# Patient Record
Sex: MALE | Race: WHITE | NOT HISPANIC OR LATINO | Employment: FULL TIME | URBAN - METROPOLITAN AREA
[De-identification: names, ages, dates, MRNs, and addresses within clinical notes are randomized per-mention and may not be internally consistent; named-entity substitution may affect disease eponyms.]

---

## 2022-09-27 PROCEDURE — 96374 THER/PROPH/DIAG INJ IV PUSH: CPT

## 2022-09-27 PROCEDURE — 96375 TX/PRO/DX INJ NEW DRUG ADDON: CPT

## 2022-09-27 PROCEDURE — 99284 EMERGENCY DEPT VISIT MOD MDM: CPT | Mod: 25

## 2022-09-27 PROCEDURE — 250N000011 HC RX IP 250 OP 636: Performed by: EMERGENCY MEDICINE

## 2022-09-27 RX ORDER — PREDNISONE 20 MG/1
TABLET ORAL
Qty: 10 TABLET | Refills: 0 | Status: SHIPPED | OUTPATIENT
Start: 2022-09-27 | End: 2023-02-16

## 2022-09-27 RX ORDER — EPINEPHRINE 0.3 MG/.3ML
0.3 INJECTION SUBCUTANEOUS
Qty: 2 EACH | Refills: 0 | Status: SHIPPED | OUTPATIENT
Start: 2022-09-27

## 2022-09-27 RX ORDER — METHYLPREDNISOLONE SODIUM SUCCINATE 125 MG/2ML
125 INJECTION, POWDER, LYOPHILIZED, FOR SOLUTION INTRAMUSCULAR; INTRAVENOUS ONCE
Status: COMPLETED | OUTPATIENT
Start: 2022-09-27 | End: 2022-09-27

## 2022-09-27 RX ADMIN — METHYLPREDNISOLONE SODIUM SUCCINATE 125 MG: 125 INJECTION, POWDER, FOR SOLUTION INTRAMUSCULAR; INTRAVENOUS at 23:56

## 2022-09-27 RX ADMIN — FAMOTIDINE 20 MG: 10 INJECTION, SOLUTION INTRAVENOUS at 23:59

## 2022-09-27 ASSESSMENT — ENCOUNTER SYMPTOMS
CHILLS: 0
SHORTNESS OF BREATH: 0
FEVER: 0
DIAPHORESIS: 0
COUGH: 1
ABDOMINAL PAIN: 0
DIARRHEA: 0
LIGHT-HEADEDNESS: 0

## 2022-09-28 ENCOUNTER — HOSPITAL ENCOUNTER (EMERGENCY)
Facility: HOSPITAL | Age: 32
Discharge: HOME | End: 2022-09-28
Attending: EMERGENCY MEDICINE | Admitting: EMERGENCY MEDICINE
Payer: COMMERCIAL

## 2022-09-28 VITALS
HEIGHT: 71 IN | HEART RATE: 62 BPM | DIASTOLIC BLOOD PRESSURE: 63 MMHG | BODY MASS INDEX: 31.5 KG/M2 | RESPIRATION RATE: 17 BRPM | TEMPERATURE: 98.3 F | OXYGEN SATURATION: 96 % | SYSTOLIC BLOOD PRESSURE: 114 MMHG | WEIGHT: 225 LBS

## 2022-09-28 DIAGNOSIS — L50.9 URTICARIA: ICD-10-CM

## 2022-09-28 NOTE — DISCHARGE INSTRUCTIONS
You can take Benadryl 50 mg (two tablets) as needed every 4 - 6 hours for itching. If Benadryl makes you too sleepy, you can use Claritin 10 mg twice a day for itching. On top of that, you can take Pepcid 20 mg as needed twice a day for itching as well.

## 2022-09-28 NOTE — ED TRIAGE NOTES
Patient reports that he has a rash that started about ten minutes ago watching TV, unsure what would have caused it has had reaction to be sting that began like this and progressed about 20 years ago. States at this time he only has a rash.

## 2022-09-28 NOTE — ED PROVIDER NOTES
EMERGENCY DEPARTMENT ENCOUNTER      NAME: Travis Milan  AGE: 31 year old male  YOB: 1990  MRN: [id number]  EVALUATION DATE & TIME: No admission date for patient encounter.    PCP: No primary care provider on file.    ED PROVIDER: Tereza Valencia MD      Chief Complaint   Patient presents with     Rash         FINAL IMPRESSION:  1. Urticaria          ED COURSE & MEDICAL DECISION MAKING:    Pertinent Labs & Imaging studies reviewed. (See chart for details)  31 year old male presents to the Emergency Department for evaluation of rash.  He noticed sudden onset of this rash about 15 to 20 minutes prior to arrival.  He initially noticed that he was itching.  On evaluation, he has urticaria on his bilateral torso diffusely and extending into his back.  He denies any systemic involvement, including no throat tightness, throat itching, shortness of breath, abdominal pain, diarrhea, lightheadedness.  Patient had taken 75 mg of diphenhydramine prior to arrival.  Patient was given IV Solu-Medrol, IV famotidine, given he has no systemic involvement, no epinephrine was given.  We will plan to observe the patient and so long as there is no progression of his allergic reaction, will discharge home with oral prednisone and a prescription for EpiPen.  I also did put in a referral to follow-up with allergy given there is no clear etiology or trigger to his urticaria.    11:20 PM I met with patient for initial encounter.  12:44 AM I checked on patient. Rash is progressively improving. Discussed with the patient continued management of urticaria with supportive care, use of epipen if involvement of airway in the future, and follow-up with allergy.    At the conclusion of the encounter I discussed the results of all of the tests and the disposition. The questions were answered. The patient or family acknowledged understanding and was agreeable with the care plan.       MEDICATIONS GIVEN IN THE  EMERGENCY:  Medications   methylPREDNISolone sodium succinate (solu-MEDROL) injection 125 mg (125 mg Intravenous Given 9/27/22 2356)   famotidine (PEPCID) injection 20 mg (20 mg Intravenous Given 9/27/22 2359)       NEW PRESCRIPTIONS STARTED AT TODAY'S ER VISIT  New Prescriptions    EPINEPHRINE (ANY BX GENERIC EQUIV) 0.3 MG/0.3ML INJECTION 2-PACK    Inject 0.3 mLs (0.3 mg) into the muscle once as needed for anaphylaxis May repeat one time in 5-15 minutes if response to initial dose is inadequate.    PREDNISONE (DELTASONE) 20 MG TABLET    Take two tablets (= 40mg) each day for 5 (five) days          =================================================================    HPI    Patient information was obtained from: Patient    Use of : N/A         Travis Milan is a 31 year old male with a pertinent history of tobacco use who presents to this ED via walk-in for evaluation of rash.    Patient states he has a diffuse itchy rash on his torso which began 20 minutes ago when he was watching television and extends to his neck. He also notes some cough and congestion which resolved over the weekend. Patient reports having a similar rash after a bee sting which progressed to anaphylaxis 20 years ago. He took 3 25mg tablets of benadryl before arrival to ED. Patient denies any new foods, medications, insect bites or exposures. Patient vapes and is a former smoker.     Patient denies throat tightening, shortness of breath, abdominal pain, diarrhea, light headedness, fever, chills, sweats and all other relevant symptoms.      REVIEW OF SYSTEMS   Review of Systems   Constitutional: Negative for chills, diaphoresis and fever.   HENT: Positive for congestion (Resolved).    Respiratory: Positive for cough (Resolved). Negative for shortness of breath.    Gastrointestinal: Negative for abdominal pain and diarrhea.   Skin: Positive for rash.   Neurological: Negative for light-headedness.   All other systems reviewed and  "are negative.       PAST MEDICAL HISTORY:  History reviewed. No pertinent past medical history.    PAST SURGICAL HISTORY:  History reviewed. No pertinent surgical history.        CURRENT MEDICATIONS:    EPINEPHrine (ANY BX GENERIC EQUIV) 0.3 MG/0.3ML injection 2-pack  predniSONE (DELTASONE) 20 MG tablet        ALLERGIES:  Allergies   Allergen Reactions     Penicillins Rash       FAMILY HISTORY:  No family history on file.    SOCIAL HISTORY:   Social History     Socioeconomic History     Marital status: Single       VITALS:  BP (!) 158/97   Pulse 67   Temp 98.3  F (36.8  C) (Temporal)   Resp 20   Ht 1.803 m (5' 11\")   Wt 102.1 kg (225 lb)   SpO2 99%   BMI 31.38 kg/m      PHYSICAL EXAM    Constitutional: Well developed, Well nourished, NAD  HENT: Normocephalic, Atraumatic, Bilateral external ears normal, Oropharynx normal, mucous membranes moist, Nose normal.   Neck- Normal range of motion, No tenderness, Supple, No stridor.  Eyes: PERRL, EOMI, Conjunctiva normal, No discharge.   Respiratory: Normal breath sounds, No respiratory distress  Cardiovascular: Normal heart rate, Regular rhythm  GI: Bowel sounds normal, Soft, No tenderness,   Musculoskeletal: No edema. Good range of motion in all major joints. No tenderness to palpation or major deformities noted.   Integument: Warm, Dry. Erythematous maculopapular blanching rash to diffuse bilateral torso, extending to bilateral back.  Neurologic: Alert & oriented x 3, Normal motor function, Normal sensory function, No focal deficits noted. Normal gait.   Psychiatric: Affect normal, Judgment normal, Mood normal.       I, Rylan Medina, am serving as a scribe to document services personally performed by Tereza Valencia, based on my observation and the provider's statements to me. I, Tereza Valencia MD, attest that Rylan Medina is acting in a scribe capacity, has observed my performance of the services and has documented them in accordance with my direction.    Tereza" MD Erik  Emergency Medicine  [facility] EMERGENCY DEPARTMENT  [facility] [facility] [id number] [phone]      Tereza Valencia MD  09/28/22 0050

## 2023-02-16 ENCOUNTER — LAB (OUTPATIENT)
Dept: LAB | Facility: CLINIC | Age: 33
End: 2023-02-16
Payer: COMMERCIAL

## 2023-02-16 ENCOUNTER — OFFICE VISIT (OUTPATIENT)
Dept: ALLERGY | Facility: CLINIC | Age: 33
End: 2023-02-16
Attending: EMERGENCY MEDICINE
Payer: COMMERCIAL

## 2023-02-16 VITALS
WEIGHT: 238.7 LBS | RESPIRATION RATE: 16 BRPM | HEART RATE: 57 BPM | OXYGEN SATURATION: 99 % | BODY MASS INDEX: 33.29 KG/M2

## 2023-02-16 DIAGNOSIS — L50.9 URTICARIA: ICD-10-CM

## 2023-02-16 DIAGNOSIS — Z91.030 ALLERGY TO BEE STING: ICD-10-CM

## 2023-02-16 DIAGNOSIS — L50.9 URTICARIA: Primary | ICD-10-CM

## 2023-02-16 PROCEDURE — 99203 OFFICE O/P NEW LOW 30 MIN: CPT | Performed by: ALLERGY & IMMUNOLOGY

## 2023-02-16 PROCEDURE — 82785 ASSAY OF IGE: CPT

## 2023-02-16 PROCEDURE — 36415 COLL VENOUS BLD VENIPUNCTURE: CPT

## 2023-02-16 PROCEDURE — 86003 ALLG SPEC IGE CRUDE XTRC EA: CPT

## 2023-02-16 NOTE — PROGRESS NOTES
Kacey Robles is a 32 year old, presenting for the following health issues:  Allergy Consult (ER visit in September with hives on back and chest. Given prednisone. Has not happened since)      HPI     Chief complaint: Hives    History of present illness: This is a pleasant 32-year-old gentleman I was asked to see for evaluation by Dr. Valencia in regards to hives.  Patient states that in September of last year he was at home watching TV at night.  He started to notice that his back was itching and then he noticed hives that appeared all over his back and chest.  He immediately went to the emergency room where he was treated with antihistamines.  He states he took some Benadryl before going as well.  He was then placed on prednisone and the hives did not return.  No other systemic symptoms of swelling, shortness of breath, gastrointestinal symptoms.  He states he has a known bee sting allergy when he was 9 or 11 years old he was stung by a bee and developed anaphylactic shock.  He states he had breathing difficulty, hives.  He was given epinephrine at that time.  Most recent sting was when he was in his early 20s which consisted of hives that were treated with Benadryl.  He states he thinks he is allergic to hornets.  He has been stung by a yellow jacket he states without any symptoms.  He does carry an epinephrine device.  He denies any sting on the day of the hive episode in September.  He has never had hives like this previously.  He does note he has some seasonal allergies especially during the spring.  He is not sure that he has allergies in the fall.  In the ER noted states that he had a cough and congestion a few days prior to this.  He does not recall this.  He does not think he took any over-the-counter medications and does not recall anything else that was unusual.  He states his mom has a history of intermittent urticaria.    Past medical history: Heart murmur    Social history: He lives in  a home with central air and basement, no changes at home, has dogs, former smoker    Family history: Mom with a history of urticaria    Review of Systems   Constitutional, HEENT, cardiovascular, pulmonary, gi and gu systems are negative, except as otherwise noted.      Objective    Pulse 57   Resp 16   Wt 108.3 kg (238 lb 11.2 oz)   SpO2 99%   BMI 33.29 kg/m    Body mass index is 33.29 kg/m .  Physical Exam     Gen: Pleasant male not in acute distress  HEENT: Eyes no erythema of the bulbar or palpebral conjunctiva, no edema. Ears: No deformities or lesions. Nose: No congestion,  Mouth: Throat clear, no lip or tongue edema.   Neck: No masses lesions or swelling  Respiratory: No coughing with breathing, no retractions  Lymph: No visible supraclavicular or cervical lymphadenopathy  Skin: No rashes or lesions  Psych: Alert and appropriate for age    Impression report and plan:  1.  Acute urticaria  2.  Bee sting allergy    Recommend checking specific IgE to the environmental panel as well as the 5 venomous stinging insects.  Recommended to have some cetirizine on hand at home in case he were to develop hives.  I did go over chronic hives given that his mom has a history of this and exacerbating factors.  He will let me know if the hives return record all events of previous few hours.  The fact that he had cough and congestion could be that this was virally mediated.  I will follow-up the patient once testing returns.

## 2023-02-16 NOTE — LETTER
2/16/2023         RE: Travis Milan  [address]  [place] [facility] [address]        Dear Colleague,    Thank you for referring your patient, Travis Milan, to the [facility]. Please see a copy of my visit note below.          Subjective   Travis is a 32 year old, presenting for the following health issues:  Allergy Consult (ER visit in September with hives on back and chest. Given prednisone. Has not happened since)      HPI     Chief complaint: Hives    History of present illness: This is a pleasant 32-year-old gentleman I was asked to see for evaluation by Dr. Valencia in regards to hives.  Patient states that in September of last year he was at home watching TV at night.  He started to notice that his back was itching and then he noticed hives that appeared all over his back and chest.  He immediately went to the emergency room where he was treated with antihistamines.  He states he took some Benadryl before going as well.  He was then placed on prednisone and the hives did not return.  No other systemic symptoms of swelling, shortness of breath, gastrointestinal symptoms.  He states he has a known bee sting allergy when he was 9 or 11 years old he was stung by a bee and developed anaphylactic shock.  He states he had breathing difficulty, hives.  He was given epinephrine at that time.  Most recent sting was when he was in his early 20s which consisted of hives that were treated with Benadryl.  He states he thinks he is allergic to hornets.  He has been stung by a yellow jacket he states without any symptoms.  He does carry an epinephrine device.  He denies any sting on the day of the hive episode in September.  He has never had hives like this previously.  He does note he has some seasonal allergies especially during the spring.  He is not sure that he has allergies in the fall.  In the ER noted states that he had a cough and congestion a few days prior to this.  He does  not recall this.  He does not think he took any over-the-counter medications and does not recall anything else that was unusual.  He states his mom has a history of intermittent urticaria.    Past medical history: Heart murmur    Social history: He lives in a home with central air and basement, no changes at home, has dogs, former smoker    Family history: Mom with a history of urticaria    Review of Systems   Constitutional, HEENT, cardiovascular, pulmonary, gi and gu systems are negative, except as otherwise noted.     Objective    Pulse 57   Resp 16   Wt 108.3 kg (238 lb 11.2 oz)   SpO2 99%   BMI 33.29 kg/m    Body mass index is 33.29 kg/m .  Physical Exam     Gen: Pleasant male not in acute distress  HEENT: Eyes no erythema of the bulbar or palpebral conjunctiva, no edema. Ears: No deformities or lesions. Nose: No congestion,  Mouth: Throat clear, no lip or tongue edema.   Neck: No masses lesions or swelling  Respiratory: No coughing with breathing, no retractions  Lymph: No visible supraclavicular or cervical lymphadenopathy  Skin: No rashes or lesions  Psych: Alert and appropriate for age    Impression report and plan:  1.  Acute urticaria  2.  Bee sting allergy    Recommend checking specific IgE to the environmental panel as well as the 5 venomous stinging insects.  Recommended to have some cetirizine on hand at home in case he were to develop hives.  I did go over chronic hives given that his mom has a history of this and exacerbating factors.  He will let me know if the hives return record all events of previous few hours.  The fact that he had cough and congestion could be that this was virally mediated.  I will follow-up the patient once testing returns.                       Again, thank you for allowing me to participate in the care of your patient.        Sincerely,        Joselin AYALA MD

## 2023-02-21 LAB
A ALTERNATA IGE QN: <0.1 KU(A)/L
A FUMIGATUS IGE QN: 0.17 KU(A)/L
BERMUDA GRASS IGE QN: 3.09 KU(A)/L
C HERBARUM IGE QN: <0.1 KU(A)/L
CAT DANDER IGG QN: 0.23 KU(A)/L
CEDAR IGE QN: 2.73 KU(A)/L
COMMON RAGWEED IGE QN: 3 KU(A)/L
COTTONWOOD IGE QN: 3.03 KU(A)/L
D FARINAE IGE QN: 0.88 KU(A)/L
D PTERONYSS IGE QN: 0.21 KU(A)/L
DOG DANDER+EPITH IGE QN: 0.67 KU(A)/L
HONEY BEE IGE QN: 7.85 KU(A)/L
IGE SERPL-ACNC: 160 KU/L (ref 0–114)
MAPLE IGE QN: 2.9 KU(A)/L
MARSH ELDER IGE QN: 2.85 KU(A)/L
MOUSE URINE PROT IGE QN: 0.12 KU(A)/L
NETTLE IGE QN: 2.57 KU(A)/L
P NOTATUM IGE QN: 0.1 KU(A)/L
PAPER WASP IGE QN: 4.26 KU(A)/L
ROACH IGE QN: 2.28 KU(A)/L
SALTWORT IGE QN: 2.63 KU(A)/L
SILVER BIRCH IGE QN: 2.93 KU(A)/L
TIMOTHY IGE QN: 3.04 KU(A)/L
WHITE ASH IGE QN: 3.31 KU(A)/L
WHITE ELM IGE QN: 3.02 KU(A)/L
WHITE MULBERRY IGE QN: 2.62 KU(A)/L
WHITE OAK IGE QN: 2.81 KU(A)/L
WHITEFACED HORNET IGE QN: 3.63 KU(A)/L
YELLOW HORNET IGE QN: 4.11 KU(A)/L
YELLOW JACKET IGE QN: 4.86 KU(A)/L

## 2023-05-21 ENCOUNTER — HEALTH MAINTENANCE LETTER (OUTPATIENT)
Age: 33
End: 2023-05-21

## 2024-07-28 ENCOUNTER — HEALTH MAINTENANCE LETTER (OUTPATIENT)
Age: 34
End: 2024-07-28

## 2024-12-08 ENCOUNTER — OFFICE VISIT (OUTPATIENT)
Dept: URGENT CARE | Facility: URGENT CARE | Age: 34
End: 2024-12-08
Payer: COMMERCIAL

## 2024-12-08 VITALS
HEART RATE: 65 BPM | RESPIRATION RATE: 12 BRPM | DIASTOLIC BLOOD PRESSURE: 77 MMHG | SYSTOLIC BLOOD PRESSURE: 126 MMHG | TEMPERATURE: 99.1 F | OXYGEN SATURATION: 97 %

## 2024-12-08 DIAGNOSIS — J02.0 STREPTOCOCCAL PHARYNGITIS: Primary | ICD-10-CM

## 2024-12-08 LAB — DEPRECATED S PYO AG THROAT QL EIA: POSITIVE

## 2024-12-08 PROCEDURE — 99213 OFFICE O/P EST LOW 20 MIN: CPT

## 2024-12-08 PROCEDURE — 87880 STREP A ASSAY W/OPTIC: CPT

## 2024-12-08 RX ORDER — CEPHALEXIN 500 MG/1
500 CAPSULE ORAL 2 TIMES DAILY
Qty: 20 CAPSULE | Refills: 0 | Status: SHIPPED | OUTPATIENT
Start: 2024-12-08

## 2024-12-08 NOTE — PATIENT INSTRUCTIONS
Thank you for coming in to see us today!    - Take the antibiotic twice per day for 10 days  - Continue to take Tylenol and ibuprofen as needed for pain  - I recommend mixing honey with warm water or tea to help soothe your throat    - Follow up in 10 days if no improvement    Ervin Price, DO

## 2024-12-08 NOTE — PROGRESS NOTES
Assessment & Plan     Streptococcal pharyngitis  Travis presents with a fever, sore throat, and sinus pressure that began about 1 week ago. Rapid strep positive. Will prescribe 10 days of Keflex 500 mg BID.   - Streptococcus A Rapid Screen w/Reflex to PCR - Clinic Collect  - cephALEXin (KEFLEX) 500 MG capsule  Dispense: 20 capsule; Refill: 0  - Continue to take Tylenol and ibuprofen as needed for pain  - I recommend mixing honey with warm water or tea to help soothe your throat      Ervin Price DO  [facility] [place]    Subjective     Travis is a 33 year old male who presents to clinic today for the following health issues:  Chief Complaint   Patient presents with    Sinus Problem     Sinus pressure and pain. Sore throat. Symptoms started about a weeks ago.     HPI  Travis presents with sore throat and sinus pressure that has been present for about a week. Has had some fevers at home with his most recent fever yesterday. Has been taking some Dayquil, Tylenol, and ibuprofen with some pain relief. Pain with eating and drinking.     ROS: 10 point ROS neg other than the symptoms noted above in the HPI.       Objective    /77   Pulse 65   Temp 99.1  F (37.3  C) (Tympanic)   Resp 12   SpO2 97%   Physical Exam  Vitals reviewed.   Constitutional:       General: He is not in acute distress.     Appearance: Normal appearance. He is not toxic-appearing.   HENT:      Head: Normocephalic and atraumatic.      Right Ear: External ear normal.      Left Ear: External ear normal.      Mouth/Throat:      Mouth: Mucous membranes are moist.      Pharynx: Posterior oropharyngeal erythema present. No oropharyngeal exudate.   Eyes:      Extraocular Movements: Extraocular movements intact.      Conjunctiva/sclera: Conjunctivae normal.   Pulmonary:      Effort: Pulmonary effort is normal. No respiratory distress.   Lymphadenopathy:      Cervical: Cervical adenopathy present.   Skin:     General: Skin is warm  and dry.   Neurological:      Mental Status: He is alert and oriented to person, place, and time. Mental status is at baseline.   Psychiatric:         Mood and Affect: Mood normal.         Behavior: Behavior normal.         Thought Content: Thought content normal.         Judgment: Judgment normal.       Results for orders placed or performed in visit on 12/08/24 (from the past 24 hours)   Streptococcus A Rapid Screen w/Reflex to PCR - Clinic Collect    Specimen: Throat; Swab   Result Value Ref Range    Group A Strep antigen Positive (A) Negative

## 2025-08-10 ENCOUNTER — HEALTH MAINTENANCE LETTER (OUTPATIENT)
Age: 35
End: 2025-08-10